# Patient Record
Sex: FEMALE | Race: WHITE | NOT HISPANIC OR LATINO | URBAN - METROPOLITAN AREA
[De-identification: names, ages, dates, MRNs, and addresses within clinical notes are randomized per-mention and may not be internally consistent; named-entity substitution may affect disease eponyms.]

---

## 2024-08-18 ENCOUNTER — HOSPITAL ENCOUNTER (EMERGENCY)
Facility: HOSPITAL | Age: 59
Discharge: HOME | End: 2024-08-18
Attending: STUDENT IN AN ORGANIZED HEALTH CARE EDUCATION/TRAINING PROGRAM
Payer: COMMERCIAL

## 2024-08-18 ENCOUNTER — NURSE TRIAGE (OUTPATIENT)
Dept: ADMINISTRATIVE | Facility: CLINIC | Age: 59
End: 2024-08-18

## 2024-08-18 VITALS
HEART RATE: 66 BPM | TEMPERATURE: 98 F | OXYGEN SATURATION: 98 % | WEIGHT: 150 LBS | SYSTOLIC BLOOD PRESSURE: 122 MMHG | DIASTOLIC BLOOD PRESSURE: 78 MMHG | RESPIRATION RATE: 16 BRPM | HEIGHT: 67 IN | BODY MASS INDEX: 23.54 KG/M2

## 2024-08-18 DIAGNOSIS — H43.812 PVD (POSTERIOR VITREOUS DETACHMENT), LEFT EYE: Primary | ICD-10-CM

## 2024-08-18 PROCEDURE — 99283 EMERGENCY DEPT VISIT LOW MDM: CPT

## 2024-08-18 PROCEDURE — 25000003 PHARM REV CODE 250

## 2024-08-18 RX ORDER — LEVOTHYROXINE SODIUM 75 UG/1
75 TABLET ORAL
COMMUNITY

## 2024-08-18 RX ORDER — ACETAMINOPHEN 500 MG
1000 TABLET ORAL
Status: COMPLETED | OUTPATIENT
Start: 2024-08-18 | End: 2024-08-18

## 2024-08-18 RX ADMIN — ACETAMINOPHEN 1000 MG: 500 TABLET ORAL at 12:08

## 2024-08-18 NOTE — CONSULTS
Consultation Report  Ophthalmology Service    Date: 08/18/2024    Chief complaint/Reason for Consult: floaters in left eye for 4 days     History of Present Illness: Sidra Galeana is a 58 y.o. female who presents with Patient with 4 day history of new floaters in left eye, denies any significant trauma.Described as superior opaque crescent with inferior cluster of adjoining lines with interspersed punctate dots, notes potential brief flashes of light on onset but denies any recent or ongoing flashes of light. Patient denies any curtains/veils obstructing vision. Patient without any symptoms like this in the past in either eye.     POcularHx: No history of ocular problems or past ocular surgeries.  Uses glasses not contacts.    Current eye gtts: none      PMHx:  has a past medical history of Thyroid disease.     PSurgHx:  has no past surgical history on file.     Home Medications:   Prior to Admission medications    Medication Sig Start Date End Date Taking? Authorizing Provider   levothyroxine (SYNTHROID) 75 MCG tablet Take 75 mcg by mouth before breakfast.   Yes Provider, Historical        Medications this encounter:     Allergies: has No Known Allergies.     Social:  reports that she quit smoking about 25 years ago. Her smoking use included cigarettes. She has never used smokeless tobacco. She reports that she does not use drugs.     Family Hx: No family history of glaucoma, macular degeneration, or blindness. family history is not on file.     ROS: Negative x 10 except for complaints as described in HPI; negative for fever, chills, weight loss, nausea, vomiting, diarrhea, shortness of breath, nasal discharge, cough, abdominal pain, dyspnea, difficulty moving arms and legs, confusion, dysuria, palpitations, or chest pain     Ocular examination/Dilated fundus examination:  Base Eye Exam       Visual Acuity (Snellen - Linear)         Right Left    Dist cc 20/20 20/20              Tonometry (Tonopen, 1:10 PM)          Right Left    Pressure 12 10              Pupils         Pupils Dark Light Shape React APD    Right PERRL 4 2 Round Brisk None    Left PERRL 4 2 Round Brisk None              Visual Fields (Counting fingers)         Right Left     Full Full              Extraocular Movement         Right Left     Full, Ortho Full, Ortho              Neuro/Psych       Oriented x3: Yes              Dilation       Both eyes: 1% Mydriacyl, 2.5% Phenylephrine @ 1:15 PM                  Slit Lamp and Fundus Exam       External Exam         Right Left    External Normal Normal              Slit Lamp Exam         Right Left    Lids/Lashes Normal Normal    Conjunctiva/Sclera White and quiet White and quiet    Cornea Clear Clear    Anterior Chamber Deep and quiet Deep and quiet    Iris Round and reactive Round and reactive    Lens Trace Nuclear sclerosis Trace Nuclear sclerosis    Anterior Vitreous Normal Vitreous syneresis, Posterior vitreous detachment, no Abhijeet's sign              Fundus Exam         Right Left    Disc Normal, sharp pink, Peripapillary atrophy Normal, sharp pink, Peripapillary atrophy    C/D Ratio 0.2 0.2    Macula flat and attached flat and attached    Vessels Normal Normal    Periphery flat attached, no h/t/d flat attached, no h/t/d, 2-3 punctate areas of hemorrhage inferior nasal                    Assessment/Plan:   1. Hemorrhagic Posterior Vitreous Detachment Left Eye  - Patient with 4 day history of new floaters in left eye  - Patient denies any significant trauma  - Described as superior opaque crescent with inferior cluster of adjoining lines with interspersed punctate dots  - Patient notes potential brief flashes of light on onset but denies any recent or ongoing flashes of light.  - Patient denies any curtains/veils obstructing vision  - Patient denies any symptoms in right eye  - VA 20/20 OU, anterior exam wnl, IOP wnl  - DFE wnl OD, OS with vitreous syneresis noted, peripheral exam significant for 2-3  punctate areas of hemorrhage inferior nasal (2) and one inferior temporal with no surrounding holes or tears.  - Strict return precautions for RD, discussed with patient    Recommendations  - follow up with Retina clinic in 1 week for repeat DFE  - Strict return precautions for RD, discussed with patient    Patient's best contact number - [phone]      Discussed patient's history, physical, assessment and plan with Dr. Carroll, patient seen with Dr. Carroll.  If there are further questions, please page the on call ophthalmology resident.    Chandler Dumas MD  PGY2, Ophthalmology Resident  08/18/2024  1:14 PM

## 2024-08-18 NOTE — ED PROVIDER NOTES
"Encounter Date: 2024       History     Chief Complaint   Patient presents with    Eye Problem     C/o floaters to L eye for x4 days, states H/A behind the eye      58-year-old female with no significant medical history presents to the ED regarding floaters in left eye onset 4 days.  She states it looks like "spider web of lines" in her vision.  Also complaining of left-sided headache behind her eye that started this morning.  She was recently at a conference in [place] over this past week and does recall walking into a glass door and hitting the left side of her head but is unsure which day this was.  Is unsure if the floaters had developed prior to this or not.  She denies any eye pain foreign body sensation.  Denies any other complaints at this time.    The history is provided by the patient and medical records.     Review of patient's allergies indicates:  No Known Allergies  Past Medical History:   Diagnosis Date    Thyroid disease      History reviewed. No pertinent surgical history.  No family history on file.  Social History     Tobacco Use    Smoking status: Former     Current packs/day: 0.00     Types: Cigarettes     Quit date:      Years since quittin.6    Smokeless tobacco: Never   Substance Use Topics    Drug use: Never     Review of Systems  See HPI  Physical Exam     Initial Vitals [24 0942]   BP Pulse Resp Temp SpO2   (!) 127/95 (!) 56 17 98 °F (36.7 °C) 99 %      MAP       --         Physical Exam    Vitals reviewed.  Constitutional: She appears well-developed and well-nourished. She is not diaphoretic. No distress.   HENT:   Head: Normocephalic and atraumatic.   Mouth/Throat: Uvula is midline.   Eyes: EOM and lids are normal. Pupils are equal, round, and reactive to light. Right conjunctiva is not injected. Right conjunctiva has no hemorrhage. Left conjunctiva is not injected. Left conjunctiva has no hemorrhage.   Neck: Neck supple.   Normal range of motion.  Cardiovascular:  " Normal rate, regular rhythm and normal heart sounds.     Exam reveals no gallop and no friction rub.       No murmur heard.  Pulmonary/Chest: Breath sounds normal. No respiratory distress. She has no wheezes. She has no rhonchi. She has no rales.   Musculoskeletal:      Cervical back: Normal range of motion and neck supple.     Neurological: She is alert and oriented to person, place, and time. No cranial nerve deficit.   Psychiatric: She has a normal mood and affect.         ED Course   Procedures  Labs Reviewed - No data to display         Imaging Results    None          Medications   acetaminophen tablet 1,000 mg (1,000 mg Oral Given 8/18/24 1233)     Medical Decision Making  Risk  OTC drugs.         APC / Resident Notes:   Emergent evaluation of 58-year-old female presenting with left eye floaters onset 4 days.  Vitals stable.  Clinically well-appearing, in no acute distress.  See physical exam findings above.  No focal neuro deficits.  Do not think CT imaging is warranted at this time.  Will consult ophthalmology.    My differential diagnoses include but are not limited to:  Retinal detachment, vitreous hemorrhage  See ED course.  I have reviewed the patient's records and discussed with my supervising physician.        ED Course as of 08/19/24 1220   Sun Aug 18, 2024   1151 Right Eye   Right Visual Status Glasses  Right Visual Test 20/20  Left Eye   Left Visual Status Glasses  Left Visual Test 20/20  Both Eyes   Both Visual Status Glasses  Both Visual Test 20/20 [KB]   1236 Ophthalmology consulted [KB]   1448 PVD per ophthalmology.  Follow up outpatient 1 week.  Strict ED return precautions were discussed the patient with all questions answered.  She verbalized understanding and agreed to plan. [KB]      ED Course User Index  [KB] Keren Barger PA-C                           Clinical Impression:  Final diagnoses:  [H43.812] PVD (posterior vitreous detachment), left eye (Primary)          ED Disposition  Condition    Discharge Stable          ED Prescriptions    None       Follow-up Information       Follow up With Specialties Details Why Contact Info Additional Information    [facility] - [address] [place] [address] Ophthalmology Schedule an appointment as soon as possible for a visit in 1 week  [address]  [facility] [address] [phone] Please arrive on the 10th floor for check-in.    [facility] - Emergency Dept Emergency Medicine Go to  If symptoms worsen [id number]  [facility] [address] [phone]              Keren Barger PA-C  08/19/24 1221

## 2024-08-18 NOTE — TELEPHONE ENCOUNTER
Spoke with pt who reports that she has been having floaters to eye for past 4 days. Also reports that HA to left side of head. States floaters are present now. Advised to be seen by provider within 4 hours. ED advised. Verbalized understanding    Reason for Disposition   Many floaters in the eye  (Exception: Floater(s) are a chronic symptom and this is unchanged from patient's baseline pattern.)    Additional Information   Negative: Difficult to awaken or acting confused (e.g., disoriented, slurred speech)   Negative: [1] Weakness of the face, arm or leg on one side of the body AND [2] new-onset   Negative: [1] Numbness of the face, arm or leg on one side of the body AND [2] new-onset   Negative: [1] Loss of speech or garbled speech AND [2] new-onset   Negative: Passed out (e.g., fainted, lost consciousness, blacked out and was not responding)   Negative: Sounds like a life-threatening emergency to the triager   Negative: Complete loss of vision in one or both eyes   Negative: SEVERE eye pain   Negative: SEVERE headache   Negative: Double vision   Negative: [1] Blurred vision or visual changes AND [2] present now AND [3] sudden onset or new (e.g., minutes, hours, days)  (Exception: Seeing floaters / black specks OR previously diagnosed migraine headaches with same symptoms.)   Negative: Patient sounds very sick or weak to the triager   Negative: Flashes of light  (Exception: Brief from pressing on the eyeball.)    Protocols used: Headache-A-AH, Vision Loss or Change-A-AH

## 2024-08-19 ENCOUNTER — TELEPHONE (OUTPATIENT)
Dept: OPHTHALMOLOGY | Facility: CLINIC | Age: 59
End: 2024-08-19
Payer: COMMERCIAL

## 2024-08-19 NOTE — TELEPHONE ENCOUNTER
[place], [place]  [place]  Phone Number: [phone]            Previous Messages       ----- Message -----  From: hCandler Dumas MD  Sent: 8/18/2024   2:56 PM CDT  To: [facility] Ophthalmology Triage  Subject: ED follow up                                    Hello,    This patient was seen in the Emergency Department for hemorrhagic PVD OS. Can they please get follow up in Retina clinic in 1 week?    Best contact number - [phone]    Thank you!  Chandler Dumas MD MSc  [employer] Ophthalmology PGY2  Called pt [facility] appt

## 2024-08-19 NOTE — TELEPHONE ENCOUNTER
----- Message from Chandler Dumas MD sent at 8/18/2024  2:55 PM CDT -----  Regarding: ED follow up  Contact: [phone]  Hello,     This patient was seen in the Emergency Department for hemorrhagic PVD OS. Can they please get follow up in Retina clinic in 1 week?    Best contact number - [phone]    Thank you!  Chandler Dumas MD MSc  [employer] Ophthalmology PGY2

## 2024-08-19 NOTE — TELEPHONE ENCOUNTER
----- Message from Kasi Ovalles sent at 8/19/2024 10:48 AM CDT -----  Contact: [phone]  Type:  Patient Returning Call    Who Called:Pt  Who Left Message for Patient:  Does the patient know what this is regarding?:Yes  Would the patient rather a call back or a response via [email or web address]? Call Back  Best Call Back Number:[phone]  Additional Information:

## 2024-08-22 ENCOUNTER — CLINICAL SUPPORT (OUTPATIENT)
Dept: OPHTHALMOLOGY | Facility: CLINIC | Age: 59
End: 2024-08-22
Payer: COMMERCIAL

## 2024-08-22 ENCOUNTER — OFFICE VISIT (OUTPATIENT)
Dept: OPHTHALMOLOGY | Facility: CLINIC | Age: 59
End: 2024-08-22
Payer: COMMERCIAL

## 2024-08-22 DIAGNOSIS — H43.812 POSTERIOR VITREOUS DETACHMENT, LEFT: Primary | ICD-10-CM

## 2024-08-22 DIAGNOSIS — H25.13 AGE-RELATED NUCLEAR CATARACT OF BOTH EYES: ICD-10-CM

## 2024-08-22 DIAGNOSIS — H43.821 VITREOMACULAR ADHESION, RIGHT: ICD-10-CM

## 2024-08-22 PROCEDURE — 99999 PR PBB SHADOW E&M-EST. PATIENT-LVL III: CPT | Mod: PBBFAC,,, | Performed by: OPHTHALMOLOGY

## 2024-08-22 PROCEDURE — 1159F MED LIST DOCD IN RCRD: CPT | Mod: CPTII,S$GLB,, | Performed by: OPHTHALMOLOGY

## 2024-08-22 PROCEDURE — 92201 OPSCPY EXTND RTA DRAW UNI/BI: CPT | Mod: S$GLB,,, | Performed by: OPHTHALMOLOGY

## 2024-08-22 PROCEDURE — 92134 CPTRZ OPH DX IMG PST SGM RTA: CPT | Mod: S$GLB,,, | Performed by: OPHTHALMOLOGY

## 2024-08-22 PROCEDURE — 99204 OFFICE O/P NEW MOD 45 MIN: CPT | Mod: S$GLB,,, | Performed by: OPHTHALMOLOGY

## 2024-08-22 PROCEDURE — 1160F RVW MEDS BY RX/DR IN RCRD: CPT | Mod: CPTII,S$GLB,, | Performed by: OPHTHALMOLOGY

## 2024-08-22 NOTE — PROGRESS NOTES
HPI    DLS: 08/18/2024 Dr. Dumas     Eye Meds: Thera Tears prn OU     58 y.o. female is here for retina eval, hem PVD. Slight eye pain, left eye   on Sunday. Denies flashes. Notice floaters, left eye. Vision distortions   due to floaters, left eye. Light sensitivity, left eye.  Last edited by Héctor Cooper OA on 8/22/2024  2:42 PM.         A/P    ICD-10-CM ICD-9-CM   1. Posterior vitreous detachment, left  H43.812 379.21   2. Vitreomacular adhesion, right  H43.821 379.27   3. Age-related nuclear cataract of both eyes  H25.13 366.16       1. Posterior vitreous detachment, left  ED f/u for new PVD OD - Recent notes reviewed    Symptoms started few days ago    Exam notable for fresh PVD OD, no RT/RD with , few localized IRH no RT or traction  Plan: Observation for now, counseled on RT/RD risk, recheck 1 mo   Pathology of PVD, Retinal Tear, Retinal Detachment reviewed in great detail  RD precautions discussed in detail, patient expressed understanding  RTC immediately PRN (especially ANY change flashes, floaters, vision, visual field)     2. Vitreomacular adhesion, right  No RT/RD  Plan: Observation, counseled on RT/RD risk      3. Age-related nuclear cataract of both eyes  Mild NS, NVS  Plan: Observation Update Mrx prn     RTC 1 mo DFE/OCTm OU         I saw and examined the patient and reviewed in detail the findings documented. The final examination findings, image interpretations which have been independently interpreted, and plan as documented in the record represent my personal judgment and conclusions.    Pete Good MD  Vitreoretinal Surgery   [facility]

## 2024-08-22 NOTE — PROGRESS NOTES
Oct macula done ou, per Dr. Good./MA        There are no diagnoses linked to this encounter.     58 y.o. y/o here for screening for Diabetic Renopathy with non-dilated fundus photos per No, Primary Doctor